# Patient Record
Sex: FEMALE | Race: BLACK OR AFRICAN AMERICAN | NOT HISPANIC OR LATINO | URBAN - METROPOLITAN AREA
[De-identification: names, ages, dates, MRNs, and addresses within clinical notes are randomized per-mention and may not be internally consistent; named-entity substitution may affect disease eponyms.]

---

## 2021-02-08 ENCOUNTER — OFFICE VISIT (OUTPATIENT)
Dept: URBAN - METROPOLITAN AREA CLINIC 92 | Facility: CLINIC | Age: 66
End: 2021-02-08

## 2021-02-08 RX ORDER — HYDROCHLOROTHIAZIDE 12.5 MG/1
TABLET ORAL
Qty: 0 | Refills: 0 | Status: ON HOLD | COMMUNITY
Start: 2017-06-29

## 2021-02-08 RX ORDER — OMEPRAZOLE 40 MG/1
CAPSULE, DELAYED RELEASE PELLETS ORAL
Qty: 0 | Refills: 0 | Status: ON HOLD | COMMUNITY
Start: 2017-04-03

## 2021-02-08 RX ORDER — SODIUM, POTASSIUM,MAG SULFATES 17.5-3.13G
354 ML SOLUTION, RECONSTITUTED, ORAL ORAL
Qty: 354 ML | Refills: 0 | OUTPATIENT
Start: 2021-02-07 | End: 2021-02-08

## 2021-02-08 RX ORDER — LOSARTAN POTASSIUM AND HYDROCHLOROTHIAZIDE 100; 12.5 MG/1; MG/1
TABLET, FILM COATED ORAL
Qty: 0 | Refills: 0 | Status: ACTIVE | COMMUNITY
Start: 2018-01-10

## 2021-02-08 RX ORDER — ONDANSETRON HYDROCHLORIDE 4 MG/1
TAKE 1 TABLET PO 45 MIN BEFORE EACH DOSE OF BOWEL PREP TO PREVENT NAUSEA PRN TABLET, FILM COATED ORAL 1
Qty: 2 | Refills: 0 | Status: ON HOLD | COMMUNITY
Start: 2018-02-08

## 2021-02-08 NOTE — EXAM-PHYSICAL EXAM
HEENT: NC/AT, pupils nondilated, trachea midline Neck: supple, NT, no LAD Chest: Symmetrical, no labored respirations, no audible wheezing Heart: Regular rate and rhythm Abd: Soft NT/ND, no organomegaly, distention, masses Ext: no c/c/e Neuro: no focal defects, A&O

## 2021-02-08 NOTE — HPI-OTHER HISTORIES
Wt __# over 3y (was 174)  Patient was referred by Dr. Carbone for an evaluation of constipation.  A copy of this note will be sent to the referring provider   Abd pain Location Quality Frequency Duration Radiation Severity Exacerbating factors Relieving factors none despite trying FD guard, Miralax, Fiber supplement, MOM, probiotics   Assoc with constipation bloating dyspepsia   Denies abd pain N/V diarrhea  hematochezia melena jaundice unintentional wt loss   Denies htbrn dysphagia odynophagia food impaction CP cough anorexia light headedness   Denies scleral icterus, increased abd girth, LE edema, confusion, disorientation, memory loss, hematemesis  NSAID usage  EtOH / Tob  Fam Hx GI cancer  Prior colon, CT scan negative 8y ago

## 2021-05-06 ENCOUNTER — OFFICE VISIT (OUTPATIENT)
Dept: URBAN - METROPOLITAN AREA CLINIC 92 | Facility: CLINIC | Age: 66
End: 2021-05-06
Payer: COMMERCIAL

## 2021-05-06 ENCOUNTER — DASHBOARD ENCOUNTERS (OUTPATIENT)
Age: 66
End: 2021-05-06

## 2021-05-06 VITALS
SYSTOLIC BLOOD PRESSURE: 201 MMHG | WEIGHT: 181.2 LBS | HEIGHT: 70 IN | TEMPERATURE: 96 F | BODY MASS INDEX: 25.94 KG/M2 | DIASTOLIC BLOOD PRESSURE: 105 MMHG

## 2021-05-06 DIAGNOSIS — K59.01 CONSTIPATION: ICD-10-CM

## 2021-05-06 DIAGNOSIS — R10.9 ABDOMINAL PAIN: ICD-10-CM

## 2021-05-06 DIAGNOSIS — R10.13 DYSPEPSIA: ICD-10-CM

## 2021-05-06 DIAGNOSIS — R14.0 BLOATING: ICD-10-CM

## 2021-05-06 PROBLEM — 14760008 CONSTIPATION: Status: ACTIVE | Noted: 2021-02-07

## 2021-05-06 PROCEDURE — 99214 OFFICE O/P EST MOD 30 MIN: CPT | Performed by: INTERNAL MEDICINE

## 2021-05-06 RX ORDER — ONDANSETRON HYDROCHLORIDE 4 MG/1
TAKE 1 TABLET PO 45 MIN BEFORE EACH DOSE OF BOWEL PREP TO PREVENT NAUSEA PRN TABLET, FILM COATED ORAL 1
Qty: 2 | Refills: 0 | Status: ON HOLD | COMMUNITY
Start: 2018-02-08

## 2021-05-06 RX ORDER — HYDROCHLOROTHIAZIDE 12.5 MG/1
TABLET ORAL
Qty: 0 | Refills: 0 | Status: ON HOLD | COMMUNITY
Start: 2017-06-29

## 2021-05-06 RX ORDER — LOSARTAN POTASSIUM AND HYDROCHLOROTHIAZIDE 100; 12.5 MG/1; MG/1
TABLET, FILM COATED ORAL
Qty: 0 | Refills: 0 | Status: ACTIVE | COMMUNITY
Start: 2018-01-10

## 2021-05-06 RX ORDER — OMEPRAZOLE 40 MG/1
CAPSULE, DELAYED RELEASE PELLETS ORAL
Qty: 0 | Refills: 0 | Status: ON HOLD | COMMUNITY
Start: 2017-04-03

## 2021-05-06 NOTE — HPI-OTHER HISTORIES
Patient was referred by Dr. Svetlana Carbone for an evaluation of bloating.  A copy of this note will be sent to the referring provider.  She was seen in 2018 with similar sx and EGD/Colon was discussed but never happened. Most recent EGD/colon WNL 2013, bx neg.  She notes bloating X months, worse x 2 weeks, worse in the AMs, better with exercise and walking. Was having BM daily until the last week and now with BM every 3-4 days, improved some with miralax. No hematochezia or melena. Very little dairy intake.  She notes burning in her chest right after taking losartan or eating oranges but otherwise no regurg, chest pain, dysphagia. Some LUQ/flank pain that radiates to her back, intermittent and not associated w the bloating, worse with laying on that side.

## 2021-05-17 ENCOUNTER — TELEPHONE ENCOUNTER (OUTPATIENT)
Dept: URBAN - METROPOLITAN AREA CLINIC 98 | Facility: CLINIC | Age: 66
End: 2021-05-17

## 2021-05-17 ENCOUNTER — TELEPHONE ENCOUNTER (OUTPATIENT)
Dept: URBAN - METROPOLITAN AREA CLINIC 92 | Facility: CLINIC | Age: 66
End: 2021-05-17

## 2021-05-17 RX ORDER — OMEPRAZOLE 40 MG/1
1 CAPSULE 30 MINUTES BEFORE MORNING MEAL CAPSULE, DELAYED RELEASE ORAL ONCE A DAY
Qty: 30 | OUTPATIENT
Start: 2021-05-17

## 2021-05-17 RX ORDER — OMEPRAZOLE 40 MG/1
1 CAPSULE 30 MINUTES BEFORE MORNING MEAL CAPSULE, DELAYED RELEASE ORAL ONCE A DAY
Qty: 30 | Refills: 2
Start: 2021-05-17

## 2021-05-20 ENCOUNTER — OFFICE VISIT (OUTPATIENT)
Dept: URBAN - METROPOLITAN AREA CLINIC 92 | Facility: CLINIC | Age: 66
End: 2021-05-20

## 2021-05-21 ENCOUNTER — OFFICE VISIT (OUTPATIENT)
Dept: URBAN - METROPOLITAN AREA CLINIC 91 | Facility: CLINIC | Age: 66
End: 2021-05-21
Payer: COMMERCIAL

## 2021-05-21 DIAGNOSIS — R14.0 ABDOMINAL BLOATING: ICD-10-CM

## 2021-05-21 PROCEDURE — 91065 BREATH HYDROGEN/METHANE TEST: CPT | Performed by: INTERNAL MEDICINE

## 2021-05-26 ENCOUNTER — TELEPHONE ENCOUNTER (OUTPATIENT)
Dept: URBAN - METROPOLITAN AREA CLINIC 92 | Facility: CLINIC | Age: 66
End: 2021-05-26

## 2021-05-27 ENCOUNTER — TELEPHONE ENCOUNTER (OUTPATIENT)
Dept: URBAN - METROPOLITAN AREA CLINIC 92 | Facility: CLINIC | Age: 66
End: 2021-05-27

## 2021-05-27 ENCOUNTER — ERX REFILL RESPONSE (OUTPATIENT)
Dept: URBAN - METROPOLITAN AREA CLINIC 92 | Facility: CLINIC | Age: 66
End: 2021-05-27

## 2021-05-27 RX ORDER — POLYETHYLENE GLYCOL 3350, SODIUM SULFATE ANHYDROUS, SODIUM BICARBONATE, SODIUM CHLORIDE, POTASSIUM CHLORIDE 236; 22.74; 6.74; 5.86; 2.97 G/4L; G/4L; G/4L; G/4L; G/4L
AS DIRECTED POWDER, FOR SOLUTION ORAL AS DIRECTED
Qty: 4000 | Refills: 0 | OUTPATIENT

## 2021-05-27 RX ORDER — POLYETHYLENE GLYCOL 3350, SODIUM SULFATE ANHYDROUS, SODIUM BICARBONATE, SODIUM CHLORIDE, POTASSIUM CHLORIDE 236; 22.74; 6.74; 5.86; 2.97 G/4L; G/4L; G/4L; G/4L; G/4L
AS DIRECTED POWDER, FOR SOLUTION ORAL AS DIRECTED
Qty: 227 GRAMS | Refills: 0 | OUTPATIENT
Start: 2021-05-27 | End: 2021-05-28

## 2021-05-27 RX ORDER — POLYETHYLENE GLYCOL-3350 AND ELECTROLYTES 236; 6.74; 5.86; 2.97; 22.74 G/274.31G; G/274.31G; G/274.31G; G/274.31G; G/274.31G
TAKE AS DIRECTED BY MOUTH FOR 1 DAY POWDER, FOR SOLUTION ORAL
Qty: 4000 MILLILITER | Refills: 0 | OUTPATIENT

## 2021-06-02 ENCOUNTER — OFFICE VISIT (OUTPATIENT)
Dept: URBAN - METROPOLITAN AREA SURGERY CENTER 16 | Facility: SURGERY CENTER | Age: 66
End: 2021-06-02